# Patient Record
(demographics unavailable — no encounter records)

---

## 2025-04-18 NOTE — HISTORY OF PRESENT ILLNESS
[Left] : left hand dominant [FreeTextEntry1] : Date of injury: 11/16/2024 (5 months status post injury)  37 year old male patient presents for initial evaluation of left palm and ring finger injury which he sustained from rock climbing in November last year. He was seen at Dr. Noel's office at [place] where he obtained x-rays which were negative. He was recommended to go for OT which he did a course of 15 sessions. He states that OT helped him get slightly better. The patient has returned to rock climbing once a week.  no locking or catching at the left ring finger.  No functional limitations or difficulty with routine, daily activities  The patient had a previous left small finger injury which was treated surgically.

## 2025-04-18 NOTE — ASSESSMENT
[FreeTextEntry1] :  I had a lengthy discussion with the patient today regarding his chronic left finger palm pain since a rockclimbing injury this past November 2024.  I explained that the exact etiology of this is a bit unclear but my suspicion is for a partial tear of the palmar aponeurosis or A1 pulley.  I reassured the patient that no instability or flexor tendon bowstringing was appreciated on examination today.  Given the chronicity of his symptoms I recommended an MRI of the left hand to better assess the ring finger flexor tendon sheath.  If unstable appearing tears are evident consideration for surgical intervention will be made and this MRI would be used for preoperative planning.   He was in accordance with the plan and I will plan to contact him once the MRI is completed.

## 2025-04-18 NOTE — PHYSICAL EXAM
[de-identified] : Physical exam demonstrates the patient to be alert and oriented x 3 and capable of ambulation. The patient is well-developed and well-nourished in no apparent respiratory distress. The majority of the skin is intact bilaterally in the upper extremities without any bilateral elbow lymphadenopathy.  The wrists have symmetric range of motion bilaterally. There is no tenderness over the scaphoid, scapholunate, or lunotriquetral ligaments bilaterally. There is a negative Shabazz's test bilaterally. There is no tenderness over the distal radial ulnar joint or TFCC and no evidence of instability bilaterally. There is no tenderness over the pisotriquetral joint, hamate hook, or CMC joints bilaterally. The patient is nontender over both scaphoids and anatomic snuffbox is bilaterally. There is no clubbing cyanosis or edema.  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 There is mild prominence of the PIP joints of all digits. There is full range of motion of the left ring finger. There is no tenderness to palpation over the lateral aspects of the left ring finger PIP joint. There is mild tenderness to palpation over the A1 pulley of the left ring finger. No bowstringing of the digit. Central slip and terminal tendon are intact. FDP and FDS are intact. The left-hand digits are well perfused. Sensation intact to light touch throughout the entire left hand.   There is good capillary refill of the digits bilaterally. There are no masses palpated or sensitivity over the median and ulnar nerves at the level of the wrist. There is a negative Tinel's and negative Phalen's and a negative carpal tunnel compression test bilaterally. Sensation is intact to light touch bilaterally. [de-identified] : PA, lateral and oblique X-Rays of the left ring finger and hand were obtained today to assess for bony injury, masses or lesions. 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 There is presence of a possible chronic ulnar collateral ligament avulsion fracture at the left ring finger PIP joint.